# Patient Record
Sex: FEMALE | Race: BLACK OR AFRICAN AMERICAN | URBAN - METROPOLITAN AREA
[De-identification: names, ages, dates, MRNs, and addresses within clinical notes are randomized per-mention and may not be internally consistent; named-entity substitution may affect disease eponyms.]

---

## 2021-10-21 ENCOUNTER — OFFICE VISIT (OUTPATIENT)
Dept: ORTHOPEDIC SURGERY | Age: 86
End: 2021-10-21
Payer: MEDICARE

## 2021-10-21 VITALS
TEMPERATURE: 96.8 F | HEIGHT: 62 IN | HEART RATE: 81 BPM | BODY MASS INDEX: 28.82 KG/M2 | RESPIRATION RATE: 16 BRPM | OXYGEN SATURATION: 99 % | WEIGHT: 156.6 LBS

## 2021-10-21 DIAGNOSIS — M40.00 POSTURAL KYPHOSIS, UNSPECIFIED SPINAL REGION: ICD-10-CM

## 2021-10-21 DIAGNOSIS — M25.562 CHRONIC PAIN OF LEFT KNEE: ICD-10-CM

## 2021-10-21 DIAGNOSIS — G89.29 CHRONIC PAIN OF LEFT KNEE: ICD-10-CM

## 2021-10-21 DIAGNOSIS — M17.11 PRIMARY OSTEOARTHRITIS OF RIGHT KNEE: ICD-10-CM

## 2021-10-21 DIAGNOSIS — M17.12 PRIMARY OSTEOARTHRITIS OF LEFT KNEE: ICD-10-CM

## 2021-10-21 DIAGNOSIS — G89.29 CHRONIC PAIN OF RIGHT KNEE: Primary | ICD-10-CM

## 2021-10-21 DIAGNOSIS — M25.561 CHRONIC PAIN OF RIGHT KNEE: Primary | ICD-10-CM

## 2021-10-21 PROCEDURE — G8400 PT W/DXA NO RESULTS DOC: HCPCS | Performed by: SPECIALIST

## 2021-10-21 PROCEDURE — G8432 DEP SCR NOT DOC, RNG: HCPCS | Performed by: SPECIALIST

## 2021-10-21 PROCEDURE — G8536 NO DOC ELDER MAL SCRN: HCPCS | Performed by: SPECIALIST

## 2021-10-21 PROCEDURE — 20610 DRAIN/INJ JOINT/BURSA W/O US: CPT | Performed by: SPECIALIST

## 2021-10-21 PROCEDURE — 73562 X-RAY EXAM OF KNEE 3: CPT | Performed by: SPECIALIST

## 2021-10-21 PROCEDURE — 1101F PT FALLS ASSESS-DOCD LE1/YR: CPT | Performed by: SPECIALIST

## 2021-10-21 PROCEDURE — 1090F PRES/ABSN URINE INCON ASSESS: CPT | Performed by: SPECIALIST

## 2021-10-21 PROCEDURE — 99203 OFFICE O/P NEW LOW 30 MIN: CPT | Performed by: SPECIALIST

## 2021-10-21 PROCEDURE — G8419 CALC BMI OUT NRM PARAM NOF/U: HCPCS | Performed by: SPECIALIST

## 2021-10-21 PROCEDURE — G8427 DOCREV CUR MEDS BY ELIG CLIN: HCPCS | Performed by: SPECIALIST

## 2021-10-21 RX ORDER — VALSARTAN AND HYDROCHLOROTHIAZIDE 320; 12.5 MG/1; MG/1
TABLET, FILM COATED ORAL
COMMUNITY

## 2021-10-21 RX ORDER — BETAMETHASONE SODIUM PHOSPHATE AND BETAMETHASONE ACETATE 3; 3 MG/ML; MG/ML
3 INJECTION, SUSPENSION INTRA-ARTICULAR; INTRALESIONAL; INTRAMUSCULAR; SOFT TISSUE ONCE
Status: COMPLETED | OUTPATIENT
Start: 2021-10-21 | End: 2021-10-21

## 2021-10-21 RX ORDER — FLUTICASONE PROPIONATE 50 MCG
SPRAY, SUSPENSION (ML) NASAL
COMMUNITY

## 2021-10-21 RX ORDER — DICLOFENAC SODIUM 10 MG/G
GEL TOPICAL
COMMUNITY

## 2021-10-21 RX ADMIN — BETAMETHASONE SODIUM PHOSPHATE AND BETAMETHASONE ACETATE 3 MG: 3; 3 INJECTION, SUSPENSION INTRA-ARTICULAR; INTRALESIONAL; INTRAMUSCULAR; SOFT TISSUE at 09:19

## 2021-10-21 NOTE — PROGRESS NOTES
Patient: Kajal Hdz                MRN: [id number]       SSN: [id number]  YOB: 1935        AGE: 81 y.o. SEX: female    PCP: Mittie Brunner, MD  10/21/21    Chief Complaint   Patient presents with    Knee Pain     kimberlee knee pain     HISTORY:  Kajal Hdz is a 81 y.o. female who is seen for bilateral knee pain R>L. She has been experiencing mostly right knee pain for the past few years. She does not recall any injury. She feels pain with standing, walking and stair climbing. She experiences startup pain after sitting. She says she can barely walk sometimes due to her right knee pain. She responded temporarily to two prior cortisone injections at the [facility] orthopedic clinic. Pain Assessment  10/21/2021   Location of Pain Knee   Location Modifiers Right;Left   Severity of Pain 0   Quality of Pain (No Data)   Quality of Pain Comment sore   Duration of Pain A few hours   Frequency of Pain Intermittent   Date Pain First Started (No Data)   Date Pain First Started Comment years   Relieving Factors Other (Comment)   Relieving Factors Comment icy hot   Result of Injury No     Occupation, etc:  Ms. Odalys Nixon is retired. 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 She is partial left pneumonectomy 1980. She lives alone in [place]. She has 2 sons and 4 daughters. She has too many to count grandchildren and great grandchildren. She has some family in [place], [facility], [facility], and [place]. Ms. Odalys Nixon weighs 156 lbs and is 5'2\" tall. She is accompanied by one her daughters today. No results found for: HBA1C, YKI7WHYW, NVX5HJWX, UFX5UJMV  Weight Metrics 10/21/2021 6/26/2019 5/31/2019   Weight 156 lb 9.6 oz 145 lb 145 lb 4 oz   BMI 28.64 kg/m2 26.52 kg/m2 26.57 kg/m2       There is no problem list on file for this patient.     REVIEW OF SYSTEMS:    Constitutional Symptoms: Negative   Eyes: Negative   Ears, Nose, Throat and Mouth: Negative   Cardiovascular: Negative   Respiratory: Negative   Genitourinary: Per HPI   Gastrointestinal: Per HPI   Integumentary (Skin and/or Breast): Negative   Musculoskeletal: Per HPI   Endocrine/Rheumatologic: Negative   Neurological: Per HPI   Hematology/Lymphatic: Negative    Allergic/Immunologic: Negative   Phychiatric: Negative    Social History     Socioeconomic History    Marital status:      Spouse name: Not on file    Number of children: Not on file    Years of education: Not on file    Highest education level: Not on file   Occupational History    Not on file   Tobacco Use    Smoking status: Former Smoker     Quit date: 1978     Years since quittin.8    Smokeless tobacco: Never Used   Substance and Sexual Activity    Alcohol use: Never    Drug use: Never    Sexual activity: Not on file   Other Topics Concern    Not on file   Social History Narrative    Not on file     Social Determinants of Health     Financial Resource Strain:     Difficulty of Paying Living Expenses:    Food Insecurity:     Worried About Running Out of Food in the Last Year:     Ran Out of Food in the Last Year:    Transportation Needs:     Lack of Transportation (Medical):      Lack of Transportation (Non-Medical):    Physical Activity:     Days of Exercise per Week:     Minutes of Exercise per Session:    Stress:     Feeling of Stress :    Social Connections:     Frequency of Communication with Friends and Family:     Frequency of Social Gatherings with Friends and Family:     Attends Spiritism Services:     Active Member of Clubs or Organizations:     Attends Club or Organization Meetings:     Marital Status:    Intimate Partner Violence:     Fear of Current or Ex-Partner:     Emotionally Abused:     Physically Abused:     Sexually Abused:       No Known Allergies   Current Outpatient Medications   Medication Sig    diclofenac (VOLTAREN) 1 % gel diclofenac 1 % topical gel   apply 2 grams to affected area four times a day    fluticasone propionate (FLONASE) 50 mcg/actuation nasal spray fluticasone propionate 50 mcg/actuation nasal spray,suspension   instill 2 sprays into each nostril once daily for allergies    valsartan-hydroCHLOROthiazide (DIOVAN-HCT) 320-12.5 mg per tablet valsartan 320 mg-hydrochlorothiazide 12.5 mg tablet   take 1 tablet by mouth once daily for blood pressure    omeprazole (PRILOSEC) 20 mg capsule take 1 capsule by mouth once daily    simvastatin (ZOCOR) 20 mg tablet Take  by mouth nightly.  potassium chloride SR (KLOR-CON 10) 10 mEq tablet     albuterol (VENTOLIN HFA) 90 mcg/actuation inhaler Take  by inhalation.  losartan-hydroCHLOROthiazide (HYZAAR) 100-12.5 mg per tablet  (Patient not taking: Reported on 10/21/2021)    P-COL RITE 8.6-50 mg per tablet take 1 tablet by mouth once daily if needed for constipation (Patient not taking: Reported on 10/21/2021)     No current facility-administered medications for this visit.       PHYSICAL EXAMINATION:  Visit Vitals  Pulse 81   Temp 96.8 °F (36 °C) (Temporal)   Resp 16   Ht 5' 2\" (1.575 m)   Wt 156 lb 9.6 oz (71 kg)   SpO2 99%   BMI 28.64 kg/m²      ORTHO EXAMINATION:  Examination Right knee Left knee   Skin Intact Intact   Range of motion 90-10 120-0   Effusion - -   Medial joint line tenderness + -   Lateral joint line tenderness - -   Popliteal tenderness - -   Osteophytes palpable + -   Blanquitas - -   Patella crepitus - -   Anterior drawer - -   Lateral laxity - -   Medial laxity - -   Varus deformity - -   Valgus deformity + +   Pretibial edema - -   Calf tenderness - -   Walks forward flexed at waist with single point cane  Slow shuffling gait, mild kyphosis        TIME OUT:  Chart reviewed for the following:   Carlos SPANGLER MD, have reviewed the History, Physical and updated the Allergic reactions for [address] [place] [address] performed immediately prior to start of procedure:  Carlos SPANGLER, MD, have performed the following reviews on [facility] prior to the start of the procedure:          * Patient was identified by name and date of birth   * Agreement on procedure being performed was verified  * Risks and Benefits explained to the patient  * Procedure site verified and marked as necessary  * Patient was positioned for comfort  * Consent was obtained     Time: 9:00 AM     Date of procedure: 10/21/2021  Procedure performed by:  Daniel Childs MD  Ms. Lisa Colorado tolerated the procedure well with no complications. RADIOGRAPHS:  XR RIGHT KNEE 10/21/21 MIKE  IMPRESSION:  Three views with bilateral knees on AP view - No fractures, no effusion, severe bilateral joint space narrowing, + osteophytes present. Kellgren Thong grade 4, valgus deformity    IMPRESSION:      ICD-10-CM ICD-9-CM    1. Chronic pain of right knee  M25.561 719.46 AMB POC X-RAY KNEE 3 VIEW    G89.29 338.29    2. Primary osteoarthritis of right knee  M17.11 715.16 betamethasone (CELESTONE) injection 3 mg      DRAIN/INJECT LARGE JOINT/BURSA      PROCEDURE AUTHORIZATION TO    3. Primary osteoarthritis of left knee  M17.12 715.16 PROCEDURE AUTHORIZATION TO    4. Chronic pain of left knee  M25.562 719.46     G89.29 338.29    5. Postural kyphosis, unspecified spinal region  M40.00 737.10      PLAN:  Consider visco supplementation if pain continues. After discussing treatment options, patient's right knee was injected with 4 cc Marcaine and 1/2 cc Celestone. There is no need for surgery at this time. She will follow up as needed.       Scribed by Ryan Peañ ([facility]) as dictated by Daniel Childs MD

## 2021-10-26 ENCOUNTER — DOCUMENTATION ONLY (OUTPATIENT)
Dept: ORTHOPEDIC SURGERY | Age: 86
End: 2021-10-26